# Patient Record
Sex: FEMALE | Race: WHITE | URBAN - METROPOLITAN AREA
[De-identification: names, ages, dates, MRNs, and addresses within clinical notes are randomized per-mention and may not be internally consistent; named-entity substitution may affect disease eponyms.]

---

## 2022-08-01 NOTE — PATIENT INSTRUCTIONS
Vaccine Information Statement    HPV (Human Papillomavirus) Vaccine: What You Need to Know    Many vaccine information statements are available in [place] and other languages. See www.immunize.org/vis. Hojas de información sobre vacunas están disponibles en español y en muchos otros idiomas. Visite www.immunize.org/vis. 1. Why get vaccinated? HPV (human papillomavirus) vaccine can prevent infection with some types of human papillomavirus. HPV infections can cause certain types of cancers, including:    cervical, vaginal, and vulvar cancers in women   penile cancer in men  anal cancers in both men and women  cancers of tonsils, base of tongue, and back of throat (oropharyngeal cancer) in both men and women    HPV infections can also cause anogenital warts. HPV vaccine can prevent over 90% of cancers caused by HPV. HPV is spread through intimate skin-to-skin or sexual contact. HPV infections are so common that nearly all people will get at least one type of HPV at some time in their lives. Most HPV infections go away on their own within 2 years. But sometimes HPV infections will last longer and can cause cancers later in life. 2. HPV vaccine    HPV vaccine is routinely recommended for adolescents at 6or 15years of age to ensure they are protected before they are exposed to the virus. HPV vaccine may be given beginning at age 5 years and vaccination is recommended for everyone through 32years of age. HPV vaccine may be given to adults 32 through 39years of age, based on discussions between the patient and health care provider. Most children who get the first dose before 13years of age need 2 doses of HPV vaccine. People who get the first dose at or after 13years of age and younger people with certain immunocompromising conditions need 3 doses. Your health care provider can give you more information. HPV vaccine may be given at the same time as other vaccines.     3. Talk with your health care provider    Tell your vaccination provider if the person getting the vaccine:  Has had an allergic reaction after a previous dose of HPV vaccine, or has any severe, life-threatening allergies   Is pregnant--HPV vaccine is not recommended until after pregnancy    In some cases, your health care provider may decide to postpone HPV vaccination until a future visit. People with minor illnesses, such as a cold, may be vaccinated. People who are moderately or severely ill should usually wait until they recover before getting HPV vaccine. Your health care provider can give you more information. 4. Risks of a vaccine reaction    Soreness, redness, or swelling where the shot is given can happen after HPV vaccination. Fever or headache can happen after HPV vaccination. People sometimes faint after medical procedures, including vaccination. Tell your provider if you feel dizzy or have vision changes or ringing in the ears. As with any medicine, there is a very remote chance of a vaccine causing a severe allergic reaction, other serious injury, or death. 5. What if there is a serious problem? An allergic reaction could occur after the vaccinated person leaves the clinic. If you see signs of a severe allergic reaction (hives, swelling of the face and throat, difficulty breathing, a fast heartbeat, dizziness, or weakness), call 9-1-1 and get the person to the nearest hospital.    For other signs that concern you, call your health care provider. Adverse reactions should be reported to the Vaccine Adverse Event Reporting System (VAERS). Your health care provider will usually file this report, or you can do it yourself. Visit the VAERS website at www.vaers. hhs.gov or call 1-556.620.5098. VAERS is only for reporting reactions, and VAERS staff members do not give medical advice.     6. The National Vaccine Injury Compensation Program    The [facility] Per Vaccine Injury Compensation Program ([address]) is a federal program that was created to compensate people who may have been injured by certain vaccines. Claims regarding alleged injury or death due to vaccination have a time limit for filing, which may be as short as two years. Visit the VICP website at www.[facility].gov/vaccinecompensation or call 9-801.624.5643 to learn about the program and about filing a claim. 7. How can I learn more? Ask your health care provider. Call your local or state health department. Visit the website of the Food and Drug Administration (FDA) for vaccine package inserts and additional information at www.fda.gov/vaccines-blood-biologics/vaccines. Contact the Centers for Disease Control and Prevention (CDC): Call 2-672.542.9723 (1-800-CDC-INFO) or  Visit CDCs website at www.cdc.gov/vaccines. Vaccine Information Statement   HPV Vaccine   8/6/2021  42 ANNIA Brown [id number]     Department of Health and Human Services  Centers for Disease Control and Prevention    Office Use Only

## 2022-08-01 NOTE — PROGRESS NOTES
Annual exam ages 23-44    Kathryn Putnam is a No obstetric history on file. ,  23 y.o. female   No LMP recorded. She presents for her annual checkup. She is having significant dysmenorrhea . With regard to the Gardasil vaccine, she  is unsure of she has received them . Menstrual status:    Her periods are normal in flow. She is using three to ten pads or tampons per day, usually regular with a 26-32 day interval with 3-7 day duration. She has dysmenorrhea. She reports no premenstrual symptoms. Contraception:    The current method of family planning is abstinence     Sexual history:    She  has no history on file for sexual activity. Medical conditions:    Surgical history confirmed with patient. has no past surgical history on file. Pap and Mammogram History:    She has never had a pap smear as she is under age 24. The patient has never had a mammogram.    Breast Cancer History/Substance Abuse: negative    No past medical history on file. No past surgical history on file. Allergies: Patient has no allergy information on record. Tobacco History:  has no history on file for tobacco use. Alcohol Abuse:  has no history on file for alcohol use. Drug Abuse:  has no history on file for drug use. Family Medical/Cancer History: No family history on file.      Review of Systems - History obtained from the patient  Constitutional: negative for weight loss, fever, night sweats  HEENT: negative for hearing loss, earache, congestion, snoring, sorethroat  CV: negative for chest pain, palpitations, edema  Resp: negative for cough, shortness of breath, wheezing  GI: negative for change in bowel habits, abdominal pain, black or bloody stools  : negative for frequency, dysuria, hematuria, vaginal discharge  MSK: negative for back pain, joint pain, muscle pain  Breast: negative for breast lumps, nipple discharge, galactorrhea  Skin :negative for itching, rash, hives  Neuro: negative for dizziness, headache, confusion, weakness  Psych: negative for anxiety, depression, change in mood  Heme/lymph: negative for bleeding, bruising, pallor    Physical Exam    There were no vitals taken for this visit. Constitutional  Appearance: well-nourished, well developed, alert, in no acute distress    HENT  Head and Face: appears normal    Neck  Inspection/Palpation: normal appearance, no masses or tenderness  Lymph Nodes: no lymphadenopathy present  Thyroid: gland size normal, nontender, no nodules or masses present on palpation    Chest  Respiratory Effort: breathing unlabored    Gastrointestinal  Abdominal Examination: abdomen non-tender to palpation, normal bowel sounds, no masses present  Liver and spleen: no hepatomegaly present, spleen not palpable  Hernias: no hernias identified    Skin  General Inspection: no rash, no lesions identified    Neurologic/Psychiatric  Mental Status:  Orientation: grossly oriented to person, place and time  Mood and Affect: mood normal, affect appropriate    Assessment:  Routine gynecologic examination  Her current medical status is satisfactory with no evidence of significant gynecologic issues.     Plan:  Counseled re: diet, exercise, healthy lifestyle  Return for yearly wellness visits  Gardisil counseling provided

## 2022-08-03 ENCOUNTER — OFFICE VISIT (OUTPATIENT)
Dept: OBGYN CLINIC | Age: 22
End: 2022-08-03
Payer: COMMERCIAL

## 2022-08-03 VITALS — SYSTOLIC BLOOD PRESSURE: 114 MMHG | DIASTOLIC BLOOD PRESSURE: 75 MMHG | WEIGHT: 128 LBS

## 2022-08-03 DIAGNOSIS — Z01.419 ENCOUNTER FOR GYNECOLOGICAL EXAMINATION WITHOUT ABNORMAL FINDING: Primary | ICD-10-CM

## 2022-08-03 PROCEDURE — 99395 PREV VISIT EST AGE 18-39: CPT | Performed by: OBSTETRICS & GYNECOLOGY

## 2022-08-03 RX ORDER — DROSPIRENONE AND ETHINYL ESTRADIOL 0.02-3(28)
1 KIT ORAL DAILY
Qty: 90 TABLET | Refills: 4 | Status: SHIPPED | OUTPATIENT
Start: 2022-08-03

## 2023-02-13 ENCOUNTER — TELEPHONE (OUTPATIENT)
Dept: OBGYN CLINIC | Age: 23
End: 2023-02-13

## 2023-02-13 RX ORDER — NORGESTIMATE AND ETHINYL ESTRADIOL 7DAYSX3 LO
1 KIT ORAL DAILY
Qty: 3 DOSE PACK | Refills: 3 | Status: SHIPPED | OUTPATIENT
Start: 2023-02-13

## 2023-02-13 NOTE — TELEPHONE ENCOUNTER
Patient advised of Md recommendations and prescription sent as per MD order to patient confirmed pharmacy    Patient was provided instructions for taking and starting the ocp    Patient verbalized understanding.

## 2023-02-13 NOTE — TELEPHONE ENCOUNTER
Can try Ortho Tri-Cyclen Lo. Should start with her next period. Take at night before bed  # 3 packs with 3 refills   1 po daily. Please send to her pharmacy.     (There are a number of different ocps and other forms that should help, if she has side effects from this one too then we can try a different pill or different form - I.r. ring/patch/etc.

## 2023-02-13 NOTE — TELEPHONE ENCOUNTER
23year old patient last seen in the office  8/3/2022 and currently taking ( drospirenone-ethinyl estradioL (Aurea, 28,) 3-0.02 mg tab)      Patient took the above medication for one month and stopped due to nausea, not helping with cramping, and making her acne worse    Patient is wondering if she can get a prescription for something different    Please advise    Pharmacy confirmed    Thank you